# Patient Record
Sex: FEMALE | Race: WHITE | URBAN - METROPOLITAN AREA
[De-identification: names, ages, dates, MRNs, and addresses within clinical notes are randomized per-mention and may not be internally consistent; named-entity substitution may affect disease eponyms.]

---
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
## 2025-03-25 ENCOUNTER — OFFICE VISIT (OUTPATIENT)
Age: 20
End: 2025-03-25

## 2025-03-25 VITALS
HEIGHT: 62 IN | SYSTOLIC BLOOD PRESSURE: 96 MMHG | OXYGEN SATURATION: 96 % | DIASTOLIC BLOOD PRESSURE: 64 MMHG | BODY MASS INDEX: 24.84 KG/M2 | HEART RATE: 83 BPM | WEIGHT: 135 LBS | TEMPERATURE: 98 F

## 2025-03-25 DIAGNOSIS — L03.011 PARONYCHIA OF RIGHT MIDDLE FINGER: Primary | ICD-10-CM

## 2025-03-25 RX ORDER — MUPIROCIN 20 MG/G
OINTMENT TOPICAL
Qty: 22 G | Refills: 0 | Status: SHIPPED | OUTPATIENT
Start: 2025-03-25 | End: 2025-04-01

## 2025-03-25 NOTE — PROGRESS NOTES
Otilia Santacruz (:  2005) is a 19 y.o. female,Established patient, NEW PATIENT here for evaluation of the following chief complaint(s):  Skin Problem (Swelling and pain on the middle finger on the right hand x 2 days )      ASSESSMENT/PLAN:  1. Paronychia of right middle finger  - mupirocin (BACTROBAN) 2 % ointment; Apply topically 3 times daily.  Dispense: 22 g; Refill: 0       Return if symptoms worsen or fail to improve.    SUBJECTIVE/OBJECTIVE:  PRESENT TO CLINIC WITH RIGHT MIDDLE FINGER SWOLLEN FOR TWO DAYS. NO FEVER, NO FINGER INJURY      History provided by:  Patient  Skin Problem        Vitals:    25 1251   BP: 96/64   BP Site: Left Upper Arm   Patient Position: Sitting   BP Cuff Size: Large Adult   Pulse: 83   Temp: 98 °F (36.7 °C)   TempSrc: Oral   SpO2: 96%   Weight: 61.2 kg (135 lb)   Height: 1.575 m (5' 2\")       Review of Systems   Skin:  Positive for rash and wound.       Physical Exam  Constitutional:       Appearance: Normal appearance.   HENT:      Head: Normocephalic and atraumatic.      Nose: Nose normal.      Mouth/Throat:      Mouth: Mucous membranes are moist.   Eyes:      Pupils: Pupils are equal, round, and reactive to light.   Pulmonary:      Effort: Pulmonary effort is normal.      Breath sounds: Normal breath sounds.   Musculoskeletal:         General: Normal range of motion.      Cervical back: Normal range of motion and neck supple.   Skin:     Findings: Erythema present.      Comments: RIGHT MIDDLE FINGER   Neurological:      Mental Status: She is alert and oriented to person, place, and time.   Psychiatric:         Mood and Affect: Mood normal.         Behavior: Behavior normal.           An electronic signature was used to authenticate this note.    --Justyn Dugan DO

## 2025-04-24 ENCOUNTER — OFFICE VISIT (OUTPATIENT)
Age: 20
End: 2025-04-24

## 2025-04-24 VITALS
SYSTOLIC BLOOD PRESSURE: 109 MMHG | HEIGHT: 62 IN | BODY MASS INDEX: 26.94 KG/M2 | OXYGEN SATURATION: 93 % | WEIGHT: 146.4 LBS | DIASTOLIC BLOOD PRESSURE: 62 MMHG | TEMPERATURE: 98.4 F | HEART RATE: 79 BPM

## 2025-04-24 DIAGNOSIS — J30.2 SEASONAL ALLERGIES: Primary | ICD-10-CM

## 2025-04-24 DIAGNOSIS — H10.9 CONJUNCTIVITIS OF BOTH EYES, UNSPECIFIED CONJUNCTIVITIS TYPE: ICD-10-CM

## 2025-04-24 RX ORDER — FLUTICASONE PROPIONATE 50 MCG
2 SPRAY, SUSPENSION (ML) NASAL DAILY
Qty: 48 G | Refills: 1 | Status: SHIPPED | OUTPATIENT
Start: 2025-04-24

## 2025-04-24 RX ORDER — SERTRALINE HYDROCHLORIDE 25 MG/1
TABLET, FILM COATED ORAL
COMMUNITY
Start: 2025-04-03

## 2025-04-24 RX ORDER — CETIRIZINE HYDROCHLORIDE 10 MG/1
10 TABLET ORAL DAILY
Qty: 30 TABLET | Refills: 0 | Status: SHIPPED | OUTPATIENT
Start: 2025-04-24

## 2025-04-24 RX ORDER — ERYTHROMYCIN 5 MG/G
OINTMENT OPHTHALMIC
Qty: 3.5 G | Refills: 0 | Status: SHIPPED | OUTPATIENT
Start: 2025-04-24

## 2025-04-24 ASSESSMENT — ENCOUNTER SYMPTOMS
SORE THROAT: 1
COUGH: 1
EYE DISCHARGE: 1
EYE ITCHING: 1
DIARRHEA: 0
RHINORRHEA: 1
VOMITING: 0

## 2025-04-24 NOTE — PROGRESS NOTES
normal.      Left Ear: Tympanic membrane, ear canal and external ear normal.      Mouth/Throat:      Mouth: Mucous membranes are moist.      Pharynx: Oropharynx is clear. No oropharyngeal exudate or posterior oropharyngeal erythema.   Eyes:      Extraocular Movements: Extraocular movements intact.      Pupils: Pupils are equal, round, and reactive to light.      Comments: Mild erythema of the right conjunctiva  Minimal erythema of the left conjunctiva  No chemosis or proptosis  Normal EOm bilaterally   Cardiovascular:      Rate and Rhythm: Normal rate and regular rhythm.      Heart sounds: Normal heart sounds.   Pulmonary:      Effort: Pulmonary effort is normal. No respiratory distress.      Breath sounds: Normal breath sounds. No stridor. No wheezing or rhonchi.   Musculoskeletal:         General: Normal range of motion.      Cervical back: Normal range of motion and neck supple.   Skin:     General: Skin is warm.   Neurological:      Mental Status: She is alert.   Psychiatric:         Mood and Affect: Mood normal.         Behavior: Behavior normal.         Thought Content: Thought content normal.         Judgment: Judgment normal.         Chart reviewed - reviewed PCP note from visit 11/6/24 - seen for FU of anxiety. Switched from fluoxetine to escitalopram (due to side effects). Also on buspirone      An electronic signature was used to authenticate this note.    --Nette Soriano PA-C

## 2025-04-24 NOTE — PATIENT INSTRUCTIONS
New Prescriptions    CETIRIZINE (ZYRTEC) 10 MG TABLET    Take 1 tablet by mouth daily    ERYTHROMYCIN (ROMYCIN) 5 MG/GM OPHTHALMIC OINTMENT    Apply pea sized amount to the both eyes 4 times a day for 7 days    FLUTICASONE (FLONASE) 50 MCG/ACT NASAL SPRAY    2 sprays by Each Nostril route daily     -Follow up with your PCP as needed.    -If your symptoms worsen, go to the nearest Emergency Department

## 2025-05-18 ENCOUNTER — OFFICE VISIT (OUTPATIENT)
Age: 20
End: 2025-05-18

## 2025-05-18 VITALS
TEMPERATURE: 98.1 F | DIASTOLIC BLOOD PRESSURE: 74 MMHG | HEIGHT: 62 IN | OXYGEN SATURATION: 99 % | BODY MASS INDEX: 26.87 KG/M2 | WEIGHT: 146 LBS | HEART RATE: 99 BPM | SYSTOLIC BLOOD PRESSURE: 104 MMHG | RESPIRATION RATE: 18 BRPM

## 2025-05-18 DIAGNOSIS — J02.9 SORE THROAT: Primary | ICD-10-CM

## 2025-05-18 DIAGNOSIS — J02.9 PHARYNGITIS, UNSPECIFIED ETIOLOGY: ICD-10-CM

## 2025-05-18 DIAGNOSIS — J01.00 ACUTE NON-RECURRENT MAXILLARY SINUSITIS: ICD-10-CM

## 2025-05-18 RX ORDER — BROMPHENIRAMINE MALEATE, PSEUDOEPHEDRINE HYDROCHLORIDE, AND DEXTROMETHORPHAN HYDROBROMIDE 2; 30; 10 MG/5ML; MG/5ML; MG/5ML
10 SYRUP ORAL EVERY 6 HOURS PRN
Qty: 118 ML | Refills: 0 | Status: SHIPPED | OUTPATIENT
Start: 2025-05-18

## 2025-05-18 NOTE — PATIENT INSTRUCTIONS
Follow up with your primary care provider as discussed.    Take medication as prescribed.    Increase fluid intake    [place], soft diet.    Take a medicine like acetaminophen (sample brand name: Tylenol) or ibuprofen (sample brand names: Advil, Motrin) to help bring down your fever or for discomfort.    Go to the nearest emergency room for symptoms including, but not limited to, shortness of breath, chest pain, mental status change, fevers >101, difficulty or inability to swallow, dehydration, or if symptoms worsen.

## 2025-05-19 ASSESSMENT — ENCOUNTER SYMPTOMS
DIARRHEA: 0
NAUSEA: 0
SHORTNESS OF BREATH: 0
EYE PAIN: 0
GASTROINTESTINAL NEGATIVE: 1
VOICE CHANGE: 0
FACIAL SWELLING: 1
RHINORRHEA: 0
ABDOMINAL PAIN: 0
SINUS PRESSURE: 1
COUGH: 1
SINUS PAIN: 1
VOMITING: 0
SORE THROAT: 1
TROUBLE SWALLOWING: 0
WHEEZING: 0
EYES NEGATIVE: 1
ALLERGIC/IMMUNOLOGIC NEGATIVE: 1

## 2025-05-20 ENCOUNTER — RESULTS FOLLOW-UP (OUTPATIENT)
Age: 20
End: 2025-05-20

## 2025-05-21 LAB — BACTERIA THROAT AEROBE CULT: NORMAL

## 2025-05-26 NOTE — RESULT ENCOUNTER NOTE
Strep was not detected in throat culture. However, due to her length of illness and sinus symptoms, please continue antibiotic until completed.

## 2025-06-10 ENCOUNTER — OFFICE VISIT (OUTPATIENT)
Dept: FAMILY MEDICINE CLINIC | Age: 20
End: 2025-06-10
Payer: COMMERCIAL

## 2025-06-10 VITALS — WEIGHT: 151.2 LBS | BODY MASS INDEX: 27.65 KG/M2 | TEMPERATURE: 98.2 F

## 2025-06-10 DIAGNOSIS — R05.2 SUBACUTE COUGH: Primary | ICD-10-CM

## 2025-06-10 PROCEDURE — 99213 OFFICE O/P EST LOW 20 MIN: CPT | Performed by: FAMILY MEDICINE

## 2025-06-10 NOTE — PROGRESS NOTES
Chief Complaint   Patient presents with    Congestion    Cough     Sx started in April, congestion, cough, started getting worse. Went to urgent care, told allergies, given zyrtec, flonase. Still wasn't better so after 2 -3 weeks went back to urgent care and they did throat cx and was negative.  Given abx - Augmentin.  Also told her lungs \"didn't sound good.\"  All her sx improved after the abx except the cough and past week coughing up more mucous and can hear stuff in chest. No sob. Sometimes wheezy. No fevers with any of this.     Still taking the zyrtec and cough meds. Using vicks vaporizer as well.       Smoker: vapes       Vitals:    06/10/25 1603   Temp: 98.2 °F (36.8 °C)   TempSrc: Infrared   Weight: 68.6 kg (151 lb 3.2 oz)     Wt Readings from Last 3 Encounters:   06/10/25 68.6 kg (151 lb 3.2 oz)   05/18/25 66.2 kg (146 lb)   04/24/25 66.4 kg (146 lb 6.4 oz)     Body mass index is 27.65 kg/m².    Alert and oriented x 4 NAD, affect appropriate and normal appearing weight, well hydrated, well developed.  Nares red and congested, clear drainage  OP mild erythema, no exudate, no swelling  Lung clear with good air movement and effort  CV RRR no M      ASSESSMENT AND PLAN:       Otilia was seen today for congestion and cough.    Diagnoses and all orders for this visit:    Subacute cough    Continue allergy medications and sx tx  Stop vapinig  Monitor

## 2025-07-07 ENCOUNTER — OFFICE VISIT (OUTPATIENT)
Dept: FAMILY MEDICINE CLINIC | Age: 20
End: 2025-07-07
Payer: COMMERCIAL

## 2025-07-07 VITALS — TEMPERATURE: 97.9 F | OXYGEN SATURATION: 98 %

## 2025-07-07 DIAGNOSIS — J40 BRONCHITIS: Primary | ICD-10-CM

## 2025-07-07 DIAGNOSIS — J01.90 ACUTE BACTERIAL SINUSITIS: ICD-10-CM

## 2025-07-07 DIAGNOSIS — B96.89 ACUTE BACTERIAL SINUSITIS: ICD-10-CM

## 2025-07-07 PROCEDURE — 99213 OFFICE O/P EST LOW 20 MIN: CPT | Performed by: STUDENT IN AN ORGANIZED HEALTH CARE EDUCATION/TRAINING PROGRAM

## 2025-07-07 RX ORDER — DOXYCYCLINE HYCLATE 100 MG
100 TABLET ORAL 2 TIMES DAILY
Qty: 14 TABLET | Refills: 0 | Status: SHIPPED | OUTPATIENT
Start: 2025-07-07 | End: 2025-07-14

## 2025-07-07 NOTE — PROGRESS NOTES
medications for this visit.     Allergies:  Allergies   Allergen Reactions    Fluoxetine      nightmares       ROS and PHYSICAL:   ROS:  10 point ROS otherwise negative except as mentioned in the HPI    VITALS:  Vitals:    07/07/25 1643   Temp: 97.9 °F (36.6 °C)   TempSrc: Infrared   SpO2: 98%      There is no height or weight on file to calculate BMI.    PHYSICAL EXAM:  GENERAL: NAD, alert and oriented  HEENT: Head: NC/AT. Eyes: clear conjunctiva. Ears: TMs normal bilaterally. Nose: normal, moderate nasal congestion. Throat: Oropharynx mildly erythematous; tonsillar hypertrophy.    NECK: Supple, no LAD  RESPIRATORY: Normal respiratory effort, lungs CTAB no crackles, wheezes, or rhonchi  HEART: Regular rate and rhythm, no murmurs  SKIN: Skin color, texture, and turgor normal. No rash.    ASSESSMENT & PLAN:     20 y.o. female presents to the office for a sick visit.    1. Bronchitis  2. Acute bacterial sinusitis  - May not have completely cleared the infection before with Augmentin. This is likely a recurrent infection given the nasal congestion started again 2 weeks ago. Trial treatment with doxycycline this time to see if this offers any more improvement. Stop Afrin (has used longer than 3 days). Continue flonase. Recommend saline nose rinse ([employer] product, can buy on [employer]). If continues to not have resolution, would consider referral to ENT for recurrent sinusitis.  - doxycycline hyclate (VIBRA-TABS) 100 MG tablet; Take 1 tablet by mouth 2 times daily for 7 days  Dispense: 14 tablet; Refill: 0        If the patient has a future appointment, it is displayed below:  No future appointments.      Electronically signed by Tri Toledo DO on 7/7/2025 at 5:00 PM

## 2025-08-13 ENCOUNTER — OFFICE VISIT (OUTPATIENT)
Dept: FAMILY MEDICINE CLINIC | Age: 20
End: 2025-08-13

## 2025-08-13 VITALS
BODY MASS INDEX: 25.13 KG/M2 | WEIGHT: 147.2 LBS | HEIGHT: 64 IN | TEMPERATURE: 98.2 F | HEART RATE: 68 BPM | OXYGEN SATURATION: 98 % | SYSTOLIC BLOOD PRESSURE: 100 MMHG | DIASTOLIC BLOOD PRESSURE: 62 MMHG

## 2025-08-13 DIAGNOSIS — Z11.1 SCREENING-PULMONARY TB: ICD-10-CM

## 2025-08-13 DIAGNOSIS — F41.9 ANXIETY: ICD-10-CM

## 2025-08-13 DIAGNOSIS — Z00.00 WELL ADULT EXAM: Primary | ICD-10-CM

## 2025-08-13 DIAGNOSIS — F33.42 RECURRENT MAJOR DEPRESSIVE DISORDER, IN FULL REMISSION: ICD-10-CM

## 2025-08-13 DIAGNOSIS — F17.290 NICOTINE DEPENDENCE DUE TO VAPING TOBACCO PRODUCT: ICD-10-CM

## 2025-08-13 RX ORDER — VARENICLINE TARTRATE 0.5 MG/1
.5-1 TABLET, FILM COATED ORAL SEE ADMIN INSTRUCTIONS
Qty: 57 TABLET | Refills: 0 | Status: CANCELLED | OUTPATIENT
Start: 2025-08-13

## 2025-08-13 RX ORDER — VARENICLINE TARTRATE 1 MG/1
1 TABLET, FILM COATED ORAL 2 TIMES DAILY
Qty: 60 TABLET | Refills: 5 | Status: CANCELLED | OUTPATIENT
Start: 2025-08-13

## 2025-08-13 ASSESSMENT — PATIENT HEALTH QUESTIONNAIRE - PHQ9
6. FEELING BAD ABOUT YOURSELF - OR THAT YOU ARE A FAILURE OR HAVE LET YOURSELF OR YOUR FAMILY DOWN: NOT AT ALL
1. LITTLE INTEREST OR PLEASURE IN DOING THINGS: SEVERAL DAYS
SUM OF ALL RESPONSES TO PHQ QUESTIONS 1-9: 4
8. MOVING OR SPEAKING SO SLOWLY THAT OTHER PEOPLE COULD HAVE NOTICED. OR THE OPPOSITE, BEING SO FIGETY OR RESTLESS THAT YOU HAVE BEEN MOVING AROUND A LOT MORE THAN USUAL: NOT AT ALL
2. FEELING DOWN, DEPRESSED OR HOPELESS: SEVERAL DAYS
SUM OF ALL RESPONSES TO PHQ QUESTIONS 1-9: 4
3. TROUBLE FALLING OR STAYING ASLEEP: SEVERAL DAYS
10. IF YOU CHECKED OFF ANY PROBLEMS, HOW DIFFICULT HAVE THESE PROBLEMS MADE IT FOR YOU TO DO YOUR WORK, TAKE CARE OF THINGS AT HOME, OR GET ALONG WITH OTHER PEOPLE: SOMEWHAT DIFFICULT
9. THOUGHTS THAT YOU WOULD BE BETTER OFF DEAD, OR OF HURTING YOURSELF: NOT AT ALL
4. FEELING TIRED OR HAVING LITTLE ENERGY: SEVERAL DAYS
SUM OF ALL RESPONSES TO PHQ QUESTIONS 1-9: 4
SUM OF ALL RESPONSES TO PHQ QUESTIONS 1-9: 4
7. TROUBLE CONCENTRATING ON THINGS, SUCH AS READING THE NEWSPAPER OR WATCHING TELEVISION: NOT AT ALL
5. POOR APPETITE OR OVEREATING: NOT AT ALL

## 2025-08-19 DIAGNOSIS — Z00.00 WELL ADULT EXAM: ICD-10-CM

## 2025-08-19 DIAGNOSIS — Z11.1 SCREENING-PULMONARY TB: ICD-10-CM

## 2025-08-21 LAB
QUANTI TB1 MINUS NIL: 0 IU/ML
QUANTI TB2 MINUS NIL: 0 IU/ML
QUANTIFERON MITOGEN MINUS NIL: 9.99 IU/ML
QUANTIFERON NIL: 0.01 IU/ML
QUANTIFERON TB INTERPRETATION: NEGATIVE IU/ML

## 2025-09-02 RX ORDER — NORGESTIMATE AND ETHINYL ESTRADIOL 0.25-0.035
1 KIT ORAL DAILY
Qty: 84 TABLET | Refills: 3 | Status: SHIPPED | OUTPATIENT
Start: 2025-09-02